# Patient Record
(demographics unavailable — no encounter records)

---

## 2024-10-18 NOTE — HISTORY OF PRESENT ILLNESS
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 8 year ago she reports she began having neck pain. Over the years to present her symptoms have worsened. If her head is neutral she has weakness in both arms R>L she now drops items and has difficulty writing. In Neutral position she states her bilat pinky fingers are numb. However, when she turns she head to the left the numbness to her arms and hands worsen and she becomes dizzy, confused and can lose consciousness. She states he head feels so heavy it's going to fall off.  This is her 3-4 opinion. Génesis Ervin at [place] recommended skull-C4 fusion. Another surgeon at [facility] recommended skull to T4 fusion. Comes in with her mother  She does have POTS. Has an aid 4 hours a day. Lives alone. On disability

## 2024-10-18 NOTE — ASSESSMENT
[FreeTextEntry1] : I, Dr. Khan, personally performed the evaluation and management (E/M) services for this new patient who presents today with exacerbation of existing condition(s). That E/M includes conducting the examination, assessing all new/exacerbated conditions, and establishing a new plan of care. Today, my ACP, Genevieve Vega, was here to observe my evaluation and management services for this new problem/exacerbated condition to be followed going forward.  PLAN: - Head turning angiogram on the same day CT head rotation imaging - PST provided to the pt

## 2024-10-18 NOTE — HISTORY OF PRESENT ILLNESS
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 8 year ago she reports she began having neck pain. Over the years to present her symptoms have worsened. If her head is neutral she has weakness in both arms R>L she now drops items and has difficulty writing. In Neutral position she states her bilat pinky fingers are numb. However, when she turns she head to the left the numbness to her arms and hands worsen and she becomes dizzy, confused and can lose consciousness. She states he head feels so heavy it's going to fall off.  This is her 3-4 opinion. Génesis Ervin at [place] recommended skull-C4 fusion. Another surgeon at [facility] recommended skull to T4 fusion. Comes in with her mother  She does have POTS. Has an aid 4 hours a day. Lives alone. On disability

## 2024-10-18 NOTE — REASON FOR VISIT
[New Patient Visit] : a new patient visit [Referred By: _________] : Patient was referred by JANEEN [FreeTextEntry1] : Cervical stenosis

## 2024-10-18 NOTE — HISTORY OF PRESENT ILLNESS
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 8 year ago she reports she began having neck pain. Over the years to present her symptoms have worsened. If her head is neutral she has weakness in both arms R>L she now drops items and has difficulty writing. In Neutral position she states her bilat pinky fingers are numb. However, when she turns she head to the left the numbness to her arms and hands worsen and she becomes dizzy, confused and can lose consciousness. She states he head feels so heavy it's going to fall off.  This is her 3-4 opinion. Génesis Ervin at [place] recommended skull-C4 fusion. Another surgeon at [facility] recommended skull to T4 fusion. Comes in with her mother  She does have POTS. Has an aid 4 hours a day. Lives alone. On disability

## 2024-10-18 NOTE — PHYSICAL EXAM
[Oriented To Time, Place, And Person] : oriented to person, place, and time [Impaired Insight] : insight and judgment were intact [Affect] : the affect was normal [Mood] : the mood was normal [Memory Recent] : recent memory was not impaired [Memory Remote] : remote memory was not impaired [Motor Tone] : muscle tone was normal in all four extremities [Motor Handedness Left-Handed] : the patient is left hand dominant [4] : C6 extensor pollicis longus  4/5 [5] : S1 toe walking 5/5 [Abnormal Walk] : normal gait [1+] : Brachioradialis left 1+ [Limited Balance] : balance was intact

## 2024-10-18 NOTE — ADDENDUM
[FreeTextEntry1] : Patient Name: IMELDA MORTON  Chief Complaint (CC): - The patient experiences blackouts, inability to see straight, numbness in her body which eventually results in loss of consciousness.  History of Present Illness: - The patient, a 31-year-old woman, presented with a history of feeling like she is about to blackout when turning her head, specifically to the left. Symptom onset is not mentioned, but it seems to have been persistent leading to severe discomfort and inability to follow normal daily activities. The patient also experiences frequent numbness and weakness in her hands. She mentioned about feeling worse when she moves her head and finds it hard to refrain from moving her head completely. The patient also reported vertebrae moving, especially when she is in a moving vehicle.  Past Medical History (PMH): - The patient has been diagnosed with autoimmune conditions, Ana-Danlos syndrome, lupus, mast cell activation, herniated disc, orthostatic hypertension, hypothyroidism, gastroparesis. She has had an ankle operation and a septoplasty.  Family History (FH): - The patient's sister and niece also have Ana-Danlos syndrome.  Social History (SH): - No specific details were provided about the patient's lifestyle and personal habits.  Medications: - Current medications include Abilify, Atenolol, Dexamethasone, doxyalamine, Paroxetine, Leucortisone, hydrocortisone, omeprazole, oxycodone, Prevacid, promethazine, Reglan, Singulair, Synthroid, Valium, Valtrex, Voltaren, Xolair, and Zoloft.  Allergies: - The patient is allergic to Baclofen, Cipro, Flexeril, and Levofloxacin.  Review of Systems (ROS): - Not explicitly discussed in the conversation.  Physical Examination (PE): - No concrete physical examination details were discussed. The patient does have a visible problem with her ankle due to failed surgery.  Laboratory/Data Review: - MRI scans of her head and spine appear normal. However, examination of MRA showed some loopy arteries.  Assessment: - I hypothesize that the patient might be experiencing kinks in the blood vessels due to the lax and tortuous nature of the blood vessels in Ana-Danlos syndrome. However, it's still not clear and requires further investigations.  Plan: - I recommend performing an angiogram to investigate if the blood vessels are indeed getting kinked causing reduced blood flow during head movement. This could help us avoid a disruptive operation which would permanently fixate her head in a position. If initial proposed angiogram results come back normal, the patient might need to consider going ahead with the suggested fusion surgery even though it would not address the root of the problem but would eliminate her symptoms.  Preventive Health: - There was no discussion about preventive measures during the conversation.